# Patient Record
Sex: MALE | URBAN - METROPOLITAN AREA
[De-identification: names, ages, dates, MRNs, and addresses within clinical notes are randomized per-mention and may not be internally consistent; named-entity substitution may affect disease eponyms.]

---

## 2017-09-06 ENCOUNTER — IMPORTED ENCOUNTER (OUTPATIENT)
Dept: URBAN - METROPOLITAN AREA CLINIC 1 | Facility: CLINIC | Age: 52
End: 2017-09-06

## 2017-09-06 PROBLEM — H52.4: Noted: 2017-09-06

## 2017-09-06 PROCEDURE — S0620 ROUTINE OPHTHALMOLOGICAL EXA: HCPCS

## 2017-09-06 NOTE — PATIENT DISCUSSION
1.  Presbyopia: Rx was given for corrective spectacles if indicated. 2.  Return for an appointment in 1 year for 40. with Dr. Maico Dee.

## 2022-04-02 ASSESSMENT — TONOMETRY
OD_IOP_MMHG: 18
OS_IOP_MMHG: 17

## 2022-04-02 ASSESSMENT — VISUAL ACUITY
OD_SC: 20/20
OS_CC: J1
OD_CC: J1
OS_SC: 20/20

## 2022-04-02 ASSESSMENT — KERATOMETRY
OS_AXISANGLE2_DEGREES: 113
OS_K1POWER_DIOPTERS: 40.25
OS_AXISANGLE_DEGREES: 023
OD_K1POWER_DIOPTERS: 40.25
OD_AXISANGLE_DEGREES: 145
OD_AXISANGLE2_DEGREES: 055
OS_K2POWER_DIOPTERS: 39.50
OD_K2POWER_DIOPTERS: 39.75